# Patient Record
Sex: FEMALE | Race: BLACK OR AFRICAN AMERICAN | NOT HISPANIC OR LATINO | URBAN - METROPOLITAN AREA
[De-identification: names, ages, dates, MRNs, and addresses within clinical notes are randomized per-mention and may not be internally consistent; named-entity substitution may affect disease eponyms.]

---

## 2017-05-24 ENCOUNTER — OUTPATIENT (OUTPATIENT)
Dept: OUTPATIENT SERVICES | Facility: HOSPITAL | Age: 32
LOS: 1 days | End: 2017-05-24

## 2017-06-02 ENCOUNTER — EMERGENCY (EMERGENCY)
Facility: HOSPITAL | Age: 32
LOS: 1 days | End: 2017-06-02
Payer: COMMERCIAL

## 2017-06-02 PROCEDURE — 70450 CT HEAD/BRAIN W/O DYE: CPT | Mod: 26

## 2017-06-02 PROCEDURE — 99284 EMERGENCY DEPT VISIT MOD MDM: CPT

## 2018-03-21 ENCOUNTER — OUTPATIENT (OUTPATIENT)
Dept: OUTPATIENT SERVICES | Facility: HOSPITAL | Age: 33
LOS: 1 days | End: 2018-03-21

## 2019-06-14 ENCOUNTER — OUTPATIENT (OUTPATIENT)
Dept: OUTPATIENT SERVICES | Facility: HOSPITAL | Age: 34
LOS: 1 days | End: 2019-06-14

## 2019-06-28 ENCOUNTER — OUTPATIENT (OUTPATIENT)
Dept: OUTPATIENT SERVICES | Facility: HOSPITAL | Age: 34
LOS: 1 days | End: 2019-06-28

## 2021-01-13 ENCOUNTER — EMERGENCY (EMERGENCY)
Facility: HOSPITAL | Age: 36
LOS: 1 days | End: 2021-01-13
Admitting: EMERGENCY MEDICINE
Payer: COMMERCIAL

## 2021-01-13 PROCEDURE — 74177 CT ABD & PELVIS W/CONTRAST: CPT | Mod: 26

## 2021-01-13 PROCEDURE — 99285 EMERGENCY DEPT VISIT HI MDM: CPT

## 2021-09-20 ENCOUNTER — EMERGENCY (EMERGENCY)
Facility: HOSPITAL | Age: 36
LOS: 1 days | End: 2021-09-20
Admitting: EMERGENCY MEDICINE
Payer: OTHER MISCELLANEOUS

## 2021-09-20 PROCEDURE — 71250 CT THORAX DX C-: CPT | Mod: 26

## 2021-09-20 PROCEDURE — 72125 CT NECK SPINE W/O DYE: CPT | Mod: 26

## 2021-09-20 PROCEDURE — 72128 CT CHEST SPINE W/O DYE: CPT | Mod: 26

## 2021-09-20 PROCEDURE — 99285 EMERGENCY DEPT VISIT HI MDM: CPT

## 2022-02-03 ENCOUNTER — APPOINTMENT (OUTPATIENT)
Dept: RADIOLOGY | Facility: CLINIC | Age: 37
End: 2022-02-03
Payer: COMMERCIAL

## 2022-02-03 ENCOUNTER — TRANSCRIPTION ENCOUNTER (OUTPATIENT)
Age: 37
End: 2022-02-03

## 2022-02-03 PROCEDURE — 73620 X-RAY EXAM OF FOOT: CPT | Mod: RT

## 2022-04-07 PROBLEM — Z00.00 ENCOUNTER FOR PREVENTIVE HEALTH EXAMINATION: Status: ACTIVE | Noted: 2022-04-07

## 2022-06-24 ENCOUNTER — APPOINTMENT (OUTPATIENT)
Dept: PHYSICAL MEDICINE AND REHAB | Facility: CLINIC | Age: 37
End: 2022-06-24

## 2022-06-24 VITALS
DIASTOLIC BLOOD PRESSURE: 88 MMHG | SYSTOLIC BLOOD PRESSURE: 148 MMHG | HEART RATE: 89 BPM | BODY MASS INDEX: 38.82 KG/M2 | WEIGHT: 233 LBS | OXYGEN SATURATION: 100 % | TEMPERATURE: 96.9 F | RESPIRATION RATE: 16 BRPM | HEIGHT: 65 IN

## 2022-06-24 DIAGNOSIS — Z87.891 PERSONAL HISTORY OF NICOTINE DEPENDENCE: ICD-10-CM

## 2022-06-24 DIAGNOSIS — Z01.818 ENCOUNTER FOR OTHER PREPROCEDURAL EXAMINATION: ICD-10-CM

## 2022-06-24 DIAGNOSIS — E66.9 OBESITY, UNSPECIFIED: ICD-10-CM

## 2022-06-24 DIAGNOSIS — Z82.49 FAMILY HISTORY OF ISCHEMIC HEART DISEASE AND OTHER DISEASES OF THE CIRCULATORY SYSTEM: ICD-10-CM

## 2022-06-24 DIAGNOSIS — R60.0 LOCALIZED EDEMA: ICD-10-CM

## 2022-06-24 DIAGNOSIS — Z78.9 OTHER SPECIFIED HEALTH STATUS: ICD-10-CM

## 2022-06-24 DIAGNOSIS — Z11.52 ENCOUNTER FOR SCREENING FOR COVID-19: ICD-10-CM

## 2022-06-24 DIAGNOSIS — K21.9 GASTRO-ESOPHAGEAL REFLUX DISEASE W/OUT ESOPHAGITIS: ICD-10-CM

## 2022-06-24 PROCEDURE — 99072 ADDL SUPL MATRL&STAF TM PHE: CPT

## 2022-06-24 PROCEDURE — 99244 OFF/OP CNSLTJ NEW/EST MOD 40: CPT | Mod: NP,25

## 2022-06-24 PROCEDURE — 87426 SARSCOV CORONAVIRUS AG IA: CPT | Mod: QW

## 2022-06-24 RX ORDER — MELOXICAM 15 MG/1
15 TABLET ORAL
Qty: 30 | Refills: 0 | Status: ACTIVE | COMMUNITY
Start: 2022-04-02

## 2022-06-24 RX ORDER — LIRAGLUTIDE 6 MG/ML
18 INJECTION, SOLUTION SUBCUTANEOUS
Qty: 15 | Refills: 0 | Status: ACTIVE | COMMUNITY
Start: 2022-04-03

## 2022-06-24 RX ORDER — TRIAMTERENE AND HYDROCHLOROTHIAZIDE 37.5; 25 MG/1; MG/1
37.5-25 CAPSULE ORAL
Qty: 90 | Refills: 0 | Status: ACTIVE | COMMUNITY
Start: 2021-06-28

## 2022-06-24 RX ORDER — OMEPRAZOLE 40 MG/1
40 CAPSULE, DELAYED RELEASE ORAL
Qty: 90 | Refills: 0 | Status: ACTIVE | COMMUNITY
Start: 2022-05-13

## 2022-06-28 NOTE — ADDENDUM
[FreeTextEntry1] : PST Labs, Cxray, and EKG reviewed \par EKG shows normal sinus rhythm with HR 73, RAMONE 166, and . \par Cxray: Unremarkable\par CBC: No elevated WBC.  H/H, and PLT WNL. \par CMP: Mild elevation of glucose levels noted, no notation of whether labs were fasting or post-prandial. Patient to f/u with PCP after surgery for repeat fasting BMP for further evaluation. CMP otherwise unremarkable. \par Covid PCR negative. \par \par At this time the patient is medically stable to proceed with proposed procedure. \par \par Thank you for allowing me the opportunity to be part of your patients care. \par \par Kind Regards, \par \par Lacho Tipton NP \par [facility]\par

## 2022-06-28 NOTE — PLAN
[FreeTextEntry1] : Covid PCR performed in office. \par Pending PST results from [facility]. \par Patient to discontinue NSAIDS and herbal supplement at this time. \par Patient to continue with present medications.  \par Increase fluid intake.\par Follow up with Primary care provider for elevated blood pressure. \par \par 45 minutes was spent with patient reviewing findings/results during this visit. Ample time was provided to answer any questions or address concerns to the patients satisfaction..\par \par

## 2022-06-28 NOTE — REVIEW OF SYSTEMS
[Joint Pain] : joint pain [Joint Stiffness] : joint stiffness [Muscle Pain] : muscle pain [Negative] : Heme/Lymph [FreeTextEntry9] : Right shoulder

## 2022-06-28 NOTE — PHYSICAL EXAM
[No JVD] : no jugular venous distention [Normal] : affect was normal and insight and judgment were intact [de-identified] : No dentures or lose teeth noted  [de-identified] : Right posterior shoulder ttp. Decreased ROM and 4/5 strength with flexion and abduction. + empty can test. NVI

## 2022-06-28 NOTE — HISTORY OF PRESENT ILLNESS
[FreeTextEntry1] : Preoperative Medical evaluation  [de-identified] : Patient presents today for pre-operative medical evaluation as requested by Dr. Morin for right shoulder surgery which is scheduled to be performed on 6/29/2022. Patient complains of right shoulder pain. She states she is otherwise feeling well and has no other complaints at this time. Denies any chest pain, shortness of breath, fever, chills, cough or cold type symptoms. Patient had PST performed at [facility] on Monday where she obtained labs and EKG. She will require Covid PCR for screening during her evaluation today as well. \par

## 2022-06-29 ENCOUNTER — TRANSCRIPTION ENCOUNTER (OUTPATIENT)
Age: 37
End: 2022-06-29

## 2022-06-29 ENCOUNTER — APPOINTMENT (OUTPATIENT)
Dept: ORTHOPEDIC SURGERY | Facility: AMBULATORY SURGERY CENTER | Age: 37
End: 2022-06-29

## 2022-06-29 LAB — SARS-COV-2 N GENE NPH QL NAA+PROBE: NOT DETECTED

## 2022-06-29 PROCEDURE — 29824 SHO ARTHRS SRG DSTL CLAVICLC: CPT | Mod: RT

## 2022-06-29 PROCEDURE — 29820 SHO ARTHRS SRG PRTL SYNVCT: CPT | Mod: RT,59

## 2022-06-29 PROCEDURE — 20610 DRAIN/INJ JOINT/BURSA W/O US: CPT | Mod: RT,59

## 2022-06-29 PROCEDURE — 29826 SHO ARTHRS SRG DECOMPRESSION: CPT | Mod: RT

## 2022-06-29 PROCEDURE — 29826 SHO ARTHRS SRG DECOMPRESSION: CPT | Mod: AS,RT

## 2022-06-29 PROCEDURE — 29820 SHO ARTHRS SRG PRTL SYNVCT: CPT | Mod: AS,RT,59

## 2022-06-29 PROCEDURE — 29823 SHO ARTHRS SRG XTNSV DBRDMT: CPT | Mod: RT

## 2022-06-29 PROCEDURE — 29824 SHO ARTHRS SRG DSTL CLAVICLC: CPT | Mod: AS,RT

## 2022-06-29 PROCEDURE — 29823 SHO ARTHRS SRG XTNSV DBRDMT: CPT | Mod: AS,RT

## 2022-06-29 RX ORDER — OXYCODONE AND ACETAMINOPHEN 5; 325 MG/1; MG/1
5-325 TABLET ORAL
Qty: 42 | Refills: 0 | Status: ACTIVE | COMMUNITY
Start: 2022-06-29 | End: 1900-01-01

## 2022-07-05 ENCOUNTER — FORM ENCOUNTER (OUTPATIENT)
Age: 37
End: 2022-07-05

## 2022-07-10 ENCOUNTER — FORM ENCOUNTER (OUTPATIENT)
Age: 37
End: 2022-07-10

## 2022-07-11 ENCOUNTER — APPOINTMENT (OUTPATIENT)
Dept: ORTHOPEDIC SURGERY | Facility: CLINIC | Age: 37
End: 2022-07-11

## 2022-07-11 VITALS — WEIGHT: 233 LBS | BODY MASS INDEX: 38.82 KG/M2 | HEIGHT: 65 IN

## 2022-07-11 DIAGNOSIS — M75.52 BURSITIS OF RIGHT SHOULDER: ICD-10-CM

## 2022-07-11 DIAGNOSIS — M75.42 IMPINGEMENT SYNDROME OF RIGHT SHOULDER: ICD-10-CM

## 2022-07-11 DIAGNOSIS — M75.51 BURSITIS OF RIGHT SHOULDER: ICD-10-CM

## 2022-07-11 DIAGNOSIS — M75.41 IMPINGEMENT SYNDROME OF RIGHT SHOULDER: ICD-10-CM

## 2022-07-11 PROCEDURE — 99024 POSTOP FOLLOW-UP VISIT: CPT

## 2022-07-11 NOTE — HISTORY OF PRESENT ILLNESS
[de-identified] : S/P Right Shoulder Scope 6/29/22. Denies fevers, chills, SOB. Pain is 4/10. Taking OxyCodone PRN. Wearing sling.

## 2022-07-11 NOTE — ASSESSMENT
[FreeTextEntry1] : Patient shown pendulum exercises and walk walk. \par \par Patient will begin physical therapy. \par \par Recommend: - rest - ice - compression - elevation \par \par FOllow up in 6 weeks
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
## 2022-07-28 ENCOUNTER — APPOINTMENT (OUTPATIENT)
Dept: ULTRASOUND IMAGING | Facility: CLINIC | Age: 37
End: 2022-07-28

## 2022-07-28 PROCEDURE — 76700 US EXAM ABDOM COMPLETE: CPT

## 2022-08-16 ENCOUNTER — FORM ENCOUNTER (OUTPATIENT)
Age: 37
End: 2022-08-16

## 2022-08-17 ENCOUNTER — FORM ENCOUNTER (OUTPATIENT)
Age: 37
End: 2022-08-17

## 2022-08-22 ENCOUNTER — APPOINTMENT (OUTPATIENT)
Dept: ORTHOPEDIC SURGERY | Facility: CLINIC | Age: 37
End: 2022-08-22

## 2022-08-22 VITALS — WEIGHT: 233 LBS | BODY MASS INDEX: 38.82 KG/M2 | HEIGHT: 65 IN

## 2022-08-22 PROCEDURE — 99024 POSTOP FOLLOW-UP VISIT: CPT

## 2022-08-22 RX ORDER — TIZANIDINE 4 MG/1
4 TABLET ORAL
Qty: 60 | Refills: 1 | Status: ACTIVE | COMMUNITY
Start: 2022-08-22 | End: 1900-01-01

## 2022-08-22 RX ORDER — MELOXICAM 15 MG/1
15 TABLET ORAL
Qty: 30 | Refills: 1 | Status: ACTIVE | COMMUNITY
Start: 2022-08-22 | End: 1900-01-01

## 2022-08-22 NOTE — ASSESSMENT
[FreeTextEntry1] : Doing well, can return to work on Sep 1, 2022. \par \par Worker's compensation 0% temporarily disabled \par \par Patient will continue physical therapy. \par \par Recommend: - rest - ice - compression - elevation \par \par Follow up in 6 weeks

## 2022-08-22 NOTE — HISTORY OF PRESENT ILLNESS
[de-identified] : S/P Right Shoulder Scope 6/29/22. Feels she is doing well. Going to PT 2x a week with relief. Taking Ibuprofen PRN.

## 2022-10-24 ENCOUNTER — APPOINTMENT (OUTPATIENT)
Dept: ORTHOPEDIC SURGERY | Facility: CLINIC | Age: 37
End: 2022-10-24

## 2022-11-14 ENCOUNTER — APPOINTMENT (OUTPATIENT)
Dept: ORTHOPEDIC SURGERY | Facility: CLINIC | Age: 37
End: 2022-11-14

## 2022-11-14 VITALS — WEIGHT: 233 LBS | HEIGHT: 65 IN | BODY MASS INDEX: 38.82 KG/M2

## 2022-11-14 PROCEDURE — 99072 ADDL SUPL MATRL&STAF TM PHE: CPT

## 2022-11-14 PROCEDURE — 99215 OFFICE O/P EST HI 40 MIN: CPT

## 2022-11-14 RX ORDER — DICLOFENAC SODIUM 1% 10 MG/G
1 GEL TOPICAL 4 TIMES DAILY
Qty: 1 | Refills: 0 | Status: ACTIVE | COMMUNITY
Start: 2022-11-14 | End: 1900-01-01

## 2022-11-14 NOTE — HISTORY OF PRESENT ILLNESS
[de-identified] : Follow up right shoulder. Experiencing pain since returning back to work. Patient works as a , feels pain when opening and closing the door. Going to PT 1-2x a week with relief. Taking Meloxicam.

## 2022-11-14 NOTE — ASSESSMENT
[FreeTextEntry1] : Worker's compensation 0% temporarily disabled \par \par Continue PT \par \par Begin using Voltaren for bicep tendinitis\par \par Recommend: - rest - ice - compression - elevation \par \par Follow up in 2 months
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
## 2023-01-09 ENCOUNTER — APPOINTMENT (OUTPATIENT)
Dept: ORTHOPEDIC SURGERY | Facility: CLINIC | Age: 38
End: 2023-01-09
Payer: OTHER MISCELLANEOUS

## 2023-01-09 VITALS — WEIGHT: 233 LBS | BODY MASS INDEX: 38.82 KG/M2 | HEIGHT: 65 IN

## 2023-01-09 PROCEDURE — 99072 ADDL SUPL MATRL&STAF TM PHE: CPT

## 2023-01-09 PROCEDURE — 99215 OFFICE O/P EST HI 40 MIN: CPT

## 2023-01-09 RX ORDER — DICLOFENAC SODIUM 1% 10 MG/G
1 GEL TOPICAL 4 TIMES DAILY
Qty: 1 | Refills: 1 | Status: ACTIVE | COMMUNITY
Start: 2023-01-09 | End: 1900-01-01

## 2023-01-09 NOTE — ASSESSMENT
[FreeTextEntry1] : Worker's compensation 0% temporarily disabled \par \par Continue PT \par \par Continue using Voltaren for bicep tendinitis\par \par Recommend: - rest - ice - compression - elevation \par \par Follow up in 2 months

## 2023-01-09 NOTE — HISTORY OF PRESENT ILLNESS
[de-identified] : Follow up right shoulder. Doing well. Experiencing discomfort at times. Going to PT 1x a week with relief. Denies pain medication. Using Diclofenac Gel.

## 2023-03-13 ENCOUNTER — APPOINTMENT (OUTPATIENT)
Dept: ORTHOPEDIC SURGERY | Facility: CLINIC | Age: 38
End: 2023-03-13
Payer: OTHER MISCELLANEOUS

## 2023-03-13 VITALS — HEIGHT: 65 IN | WEIGHT: 233 LBS | BODY MASS INDEX: 38.82 KG/M2

## 2023-03-13 PROCEDURE — 99072 ADDL SUPL MATRL&STAF TM PHE: CPT

## 2023-03-13 PROCEDURE — 99215 OFFICE O/P EST HI 40 MIN: CPT

## 2023-03-13 NOTE — ASSESSMENT
[FreeTextEntry1] : Worker's compensation 0% temporarily disabled \par \par Recommend physical therapy to regain range of motion, strengthening and symptomatic improvement. \par \par Continue using Voltaren for bicep tendinitis\par \par Recommend: - rest - ice - compression - elevation \par \par Follow up in 2 months

## 2023-03-13 NOTE — HISTORY OF PRESENT ILLNESS
[de-identified] : Follow up right shoulder. States she feels intermittent pain. Admits to going to PT 1x a week with relief. Denies pain medication. Admits to using Diclofenac Gel PRN.

## 2023-05-22 ENCOUNTER — APPOINTMENT (OUTPATIENT)
Dept: ORTHOPEDIC SURGERY | Facility: CLINIC | Age: 38
End: 2023-05-22
Payer: OTHER MISCELLANEOUS

## 2023-05-22 VITALS — HEIGHT: 65 IN | WEIGHT: 233 LBS | BODY MASS INDEX: 38.82 KG/M2

## 2023-05-22 PROCEDURE — 99215 OFFICE O/P EST HI 40 MIN: CPT

## 2023-05-22 NOTE — ASSESSMENT
[FreeTextEntry1] : Worker's compensation 0% temporarily disabled \par \par Continue PT\par \par Continue using Voltaren for bicep tendinitis\par \par Recommend: - rest - ice - compression - elevation \par \par Follow up in 2 months

## 2023-05-22 NOTE — HISTORY OF PRESENT ILLNESS
[de-identified] : Follow up right shoulder. States she feels intermittent discomfort. Admits to going to PT 1x a week with relief. Denies pain medication. Admits to using Diclofenac Gel PRN.

## 2023-07-24 ENCOUNTER — APPOINTMENT (OUTPATIENT)
Dept: ORTHOPEDIC SURGERY | Facility: CLINIC | Age: 38
End: 2023-07-24
Payer: OTHER MISCELLANEOUS

## 2023-07-24 ENCOUNTER — APPOINTMENT (OUTPATIENT)
Dept: ORTHOPEDIC SURGERY | Facility: CLINIC | Age: 38
End: 2023-07-24

## 2023-07-24 VITALS — BODY MASS INDEX: 38.82 KG/M2 | HEIGHT: 65 IN | WEIGHT: 233 LBS

## 2023-07-24 DIAGNOSIS — M67.813 OTHER SPECIFIED DISORDERS OF TENDON, RIGHT SHOULDER: ICD-10-CM

## 2023-07-24 DIAGNOSIS — Z98.890 OTHER SPECIFIED POSTPROCEDURAL STATES: ICD-10-CM

## 2023-07-24 DIAGNOSIS — M75.101 UNSPECIFIED ROTATOR CUFF TEAR OR RUPTURE OF RIGHT SHOULDER, NOT SPECIFIED AS TRAUMATIC: ICD-10-CM

## 2023-07-24 DIAGNOSIS — M12.811 UNSPECIFIED ROTATOR CUFF TEAR OR RUPTURE OF RIGHT SHOULDER, NOT SPECIFIED AS TRAUMATIC: ICD-10-CM

## 2023-07-24 PROCEDURE — 99215 OFFICE O/P EST HI 40 MIN: CPT

## 2023-07-24 NOTE — HISTORY OF PRESENT ILLNESS
[de-identified] : Follow up right shoulder. States she feels an improvement and is doing well. Admits to doing PT 1x a week with some relief. Denies taking pain medication.

## 2023-07-24 NOTE — ASSESSMENT
[FreeTextEntry1] : Worker's compensation 0% temporarily disabled \par \par Continue PT\par \par Recommend: - rest - ice - compression - elevation \par \par Follow up in 2 months

## 2023-10-18 ENCOUNTER — APPOINTMENT (OUTPATIENT)
Dept: MAMMOGRAPHY | Facility: CLINIC | Age: 38
End: 2023-10-18
Payer: COMMERCIAL

## 2023-10-18 PROCEDURE — 77067 SCR MAMMO BI INCL CAD: CPT

## 2023-10-18 PROCEDURE — 77063 BREAST TOMOSYNTHESIS BI: CPT

## 2023-10-23 ENCOUNTER — APPOINTMENT (OUTPATIENT)
Dept: ORTHOPEDIC SURGERY | Facility: CLINIC | Age: 38
End: 2023-10-23
Payer: OTHER MISCELLANEOUS

## 2023-10-23 VITALS — WEIGHT: 233 LBS | BODY MASS INDEX: 38.82 KG/M2 | HEIGHT: 65 IN

## 2023-10-23 VITALS — WEIGHT: 153 LBS | HEIGHT: 65 IN | BODY MASS INDEX: 25.49 KG/M2

## 2023-10-23 PROCEDURE — 99245 OFF/OP CONSLTJ NEW/EST HI 55: CPT

## 2024-09-16 ENCOUNTER — APPOINTMENT (OUTPATIENT)
Dept: RADIOLOGY | Facility: CLINIC | Age: 39
End: 2024-09-16
Payer: COMMERCIAL

## 2024-09-16 PROCEDURE — 73501 X-RAY EXAM HIP UNI 1 VIEW: CPT | Mod: LT

## 2024-10-03 ENCOUNTER — APPOINTMENT (OUTPATIENT)
Dept: ULTRASOUND IMAGING | Facility: CLINIC | Age: 39
End: 2024-10-03
Payer: COMMERCIAL

## 2024-10-03 PROCEDURE — 76775 US EXAM ABDO BACK WALL LIM: CPT
